# Patient Record
Sex: MALE | Race: WHITE
[De-identification: names, ages, dates, MRNs, and addresses within clinical notes are randomized per-mention and may not be internally consistent; named-entity substitution may affect disease eponyms.]

---

## 2019-10-30 ENCOUNTER — HOSPITAL ENCOUNTER (INPATIENT)
Dept: HOSPITAL 95 - ER | Age: 59
LOS: 1 days | Discharge: HOME | DRG: 247 | End: 2019-10-31
Attending: INTERNAL MEDICINE | Admitting: INTERNAL MEDICINE
Payer: COMMERCIAL

## 2019-10-30 VITALS — BODY MASS INDEX: 24.59 KG/M2 | HEIGHT: 69.02 IN | WEIGHT: 166.01 LBS

## 2019-10-30 DIAGNOSIS — E78.5: ICD-10-CM

## 2019-10-30 DIAGNOSIS — I16.1: ICD-10-CM

## 2019-10-30 DIAGNOSIS — I21.19: Primary | ICD-10-CM

## 2019-10-30 DIAGNOSIS — I10: ICD-10-CM

## 2019-10-30 DIAGNOSIS — Z87.891: ICD-10-CM

## 2019-10-30 LAB
ALBUMIN SERPL BCP-MCNC: 3.1 G/DL (ref 3.4–5)
ALBUMIN SERPL BCP-MCNC: 3.8 G/DL (ref 3.4–5)
ALBUMIN/GLOB SERPL: 1.1 {RATIO} (ref 0.8–1.8)
ALBUMIN/GLOB SERPL: 1.2 {RATIO} (ref 0.8–1.8)
ALT SERPL W P-5'-P-CCNC: 33 U/L (ref 12–78)
ALT SERPL W P-5'-P-CCNC: 36 U/L (ref 12–78)
ANION GAP SERPL CALCULATED.4IONS-SCNC: 11 MMOL/L (ref 6–16)
ANION GAP SERPL CALCULATED.4IONS-SCNC: 7 MMOL/L (ref 6–16)
AST SERPL W P-5'-P-CCNC: 28 U/L (ref 12–37)
AST SERPL W P-5'-P-CCNC: 34 U/L (ref 12–37)
BASOPHILS # BLD AUTO: 0.03 K/MM3 (ref 0–0.23)
BASOPHILS # BLD AUTO: 0.04 K/MM3 (ref 0–0.23)
BASOPHILS NFR BLD AUTO: 0 % (ref 0–2)
BASOPHILS NFR BLD AUTO: 0 % (ref 0–2)
BILIRUB SERPL-MCNC: 0.2 MG/DL (ref 0.1–1)
BILIRUB SERPL-MCNC: 0.3 MG/DL (ref 0.1–1)
BUN SERPL-MCNC: 20 MG/DL (ref 8–24)
BUN SERPL-MCNC: 25 MG/DL (ref 8–24)
CA-I BLD-SCNC: 1.12 MMOL/L (ref 1.1–1.46)
CALCIUM SERPL-MCNC: 7.8 MG/DL (ref 8.5–10.1)
CALCIUM SERPL-MCNC: 8.9 MG/DL (ref 8.5–10.1)
CHLORIDE BLD-SCNC: 103 MMOL/L (ref 98–108)
CHLORIDE SERPL-SCNC: 104 MMOL/L (ref 98–108)
CHLORIDE SERPL-SCNC: 108 MMOL/L (ref 98–108)
CHOLEST SERPL-MCNC: 324 MG/DL (ref 50–200)
CHOLEST/HDLC SERPL: 4 {RATIO}
CK MB CFR SERPL CALC: 4.1 % (ref 0–4)
CK MB CFR SERPL CALC: 4.4 % (ref 0–4)
CK MB CFR SERPL CALC: 5.6 % (ref 0–4)
CK SERPL-CCNC: 380 U/L (ref 39–308)
CK SERPL-CCNC: 444 U/L (ref 39–308)
CK SERPL-CCNC: 512 U/L (ref 39–308)
CO2 BLD-SCNC: 27 MMOL/L (ref 21–32)
CO2 SERPL-SCNC: 27 MMOL/L (ref 21–32)
CO2 SERPL-SCNC: 28 MMOL/L (ref 21–32)
CREAT BLD-MCNC: 1.2 MG/DL (ref 0.8–1.3)
CREAT SERPL-MCNC: 1.01 MG/DL (ref 0.6–1.2)
CREAT SERPL-MCNC: 1.08 MG/DL (ref 0.6–1.2)
DEPRECATED RDW RBC AUTO: 43.5 FL (ref 35.1–46.3)
DEPRECATED RDW RBC AUTO: 44.2 FL (ref 35.1–46.3)
EOSINOPHIL # BLD AUTO: 0.05 K/MM3 (ref 0–0.68)
EOSINOPHIL # BLD AUTO: 0.09 K/MM3 (ref 0–0.68)
EOSINOPHIL NFR BLD AUTO: 0 % (ref 0–6)
EOSINOPHIL NFR BLD AUTO: 1 % (ref 0–6)
ERYTHROCYTE [DISTWIDTH] IN BLOOD BY AUTOMATED COUNT: 12.7 % (ref 11.7–14.2)
ERYTHROCYTE [DISTWIDTH] IN BLOOD BY AUTOMATED COUNT: 12.9 % (ref 11.7–14.2)
GLOBULIN SER CALC-MCNC: 2.7 G/DL (ref 2.2–4)
GLOBULIN SER CALC-MCNC: 3.3 G/DL (ref 2.2–4)
GLUCOSE BLDC GLUCOMTR-MCNC: 175 MG/DL (ref 70–99)
GLUCOSE SERPL-MCNC: 123 MG/DL (ref 70–99)
GLUCOSE SERPL-MCNC: 173 MG/DL (ref 70–99)
HCT VFR BLD AUTO: 37.6 % (ref 37–53)
HCT VFR BLD AUTO: 43.9 % (ref 37–53)
HCT VFR BLD CALC: 45 % (ref 41–53)
HDLC SERPL-MCNC: 80 MG/DL (ref 39–?)
HGB BLD CALC-MCNC: 15.3 G/DL (ref 13.5–17.5)
HGB BLD-MCNC: 12.7 G/DL (ref 13.5–17.5)
HGB BLD-MCNC: 15 G/DL (ref 13.5–17.5)
IMM GRANULOCYTES # BLD AUTO: 0.03 K/MM3 (ref 0–0.1)
IMM GRANULOCYTES # BLD AUTO: 0.05 K/MM3 (ref 0–0.1)
IMM GRANULOCYTES NFR BLD AUTO: 0 % (ref 0–1)
IMM GRANULOCYTES NFR BLD AUTO: 0 % (ref 0–1)
LDLC SERPL CALC-MCNC: 220 MG/DL (ref 0–110)
LDLC/HDLC SERPL: 2.8 {RATIO}
LYMPHOCYTES # BLD AUTO: 1.43 K/MM3 (ref 0.84–5.2)
LYMPHOCYTES # BLD AUTO: 2.6 K/MM3 (ref 0.84–5.2)
LYMPHOCYTES NFR BLD AUTO: 11 % (ref 21–46)
LYMPHOCYTES NFR BLD AUTO: 23 % (ref 21–46)
MAGNESIUM SERPL-MCNC: 2 MG/DL (ref 1.6–2.4)
MCHC RBC AUTO-ENTMCNC: 33.8 G/DL (ref 31.5–36.5)
MCHC RBC AUTO-ENTMCNC: 34.2 G/DL (ref 31.5–36.5)
MCV RBC AUTO: 92 FL (ref 80–100)
MCV RBC AUTO: 93 FL (ref 80–100)
MONOCYTES # BLD AUTO: 0.86 K/MM3 (ref 0.16–1.47)
MONOCYTES # BLD AUTO: 1.04 K/MM3 (ref 0.16–1.47)
MONOCYTES NFR BLD AUTO: 8 % (ref 4–13)
MONOCYTES NFR BLD AUTO: 8 % (ref 4–13)
NEUTROPHILS # BLD AUTO: 7.83 K/MM3 (ref 1.96–9.15)
NEUTROPHILS # BLD AUTO: 9.98 K/MM3 (ref 1.96–9.15)
NEUTROPHILS NFR BLD AUTO: 68 % (ref 41–73)
NEUTROPHILS NFR BLD AUTO: 80 % (ref 41–73)
NRBC # BLD AUTO: 0 K/MM3 (ref 0–0.02)
NRBC # BLD AUTO: 0 K/MM3 (ref 0–0.02)
NRBC BLD AUTO-RTO: 0 /100 WBC (ref 0–0.2)
NRBC BLD AUTO-RTO: 0 /100 WBC (ref 0–0.2)
PLATELET # BLD AUTO: 232 K/MM3 (ref 150–400)
PLATELET # BLD AUTO: 267 K/MM3 (ref 150–400)
POTASSIUM BLD-SCNC: 3.4 MMOL/L (ref 3.5–5.5)
POTASSIUM SERPL-SCNC: 3.3 MMOL/L (ref 3.5–5.5)
POTASSIUM SERPL-SCNC: 3.4 MMOL/L (ref 3.5–5.5)
PROT SERPL-MCNC: 5.8 G/DL (ref 6.4–8.2)
PROT SERPL-MCNC: 7.1 G/DL (ref 6.4–8.2)
SODIUM BLD-SCNC: 140 MMOL/L (ref 135–148)
SODIUM SERPL-SCNC: 142 MMOL/L (ref 136–145)
SODIUM SERPL-SCNC: 143 MMOL/L (ref 136–145)
TRIGL SERPL-MCNC: 119 MG/DL (ref 30–160)
TROPONIN I SERPL-MCNC: 0.74 NG/ML (ref 0–0.04)
TROPONIN I SERPL-MCNC: 10.3 NG/ML (ref 0–0.04)
TROPONIN I SERPL-MCNC: 2.83 NG/ML (ref 0–0.04)
TROPONIN I SERPL-MCNC: 8.76 NG/ML (ref 0–0.04)
VLDLC SERPL CALC-MCNC: 23 MG/DL (ref 6–32)

## 2019-10-30 PROCEDURE — B2111ZZ FLUOROSCOPY OF MULTIPLE CORONARY ARTERIES USING LOW OSMOLAR CONTRAST: ICD-10-PCS | Performed by: INTERNAL MEDICINE

## 2019-10-30 PROCEDURE — C1753 CATH, INTRAVAS ULTRASOUND: HCPCS

## 2019-10-30 PROCEDURE — C1887 CATHETER, GUIDING: HCPCS

## 2019-10-30 PROCEDURE — C1894 INTRO/SHEATH, NON-LASER: HCPCS

## 2019-10-30 PROCEDURE — C1725 CATH, TRANSLUMIN NON-LASER: HCPCS

## 2019-10-30 PROCEDURE — C1769 GUIDE WIRE: HCPCS

## 2019-10-30 PROCEDURE — 027034Z DILATION OF CORONARY ARTERY, ONE ARTERY WITH DRUG-ELUTING INTRALUMINAL DEVICE, PERCUTANEOUS APPROACH: ICD-10-PCS | Performed by: INTERNAL MEDICINE

## 2019-10-30 PROCEDURE — C1874 STENT, COATED/COV W/DEL SYS: HCPCS

## 2019-10-30 PROCEDURE — C9606 PERC D-E COR REVASC W AMI S: HCPCS

## 2019-10-30 NOTE — NUR
PATIENT TOOK AM MEDICATIONS WITHOUT ANY PROBLEMS, STARTED REMOVEING AIR FROM
BOTH TR BANDS, NO BLEEDING NOTED AT THIS TIME, CALL LIGHT IN REACH, WILL
CONTINUE TO MONITOR.

## 2019-10-30 NOTE — NUR
PT REPORTS TENDERNESS ABOVE RADIAL ACCESS SITE. SITE ABOVE TR BAND APPEARS
BRUISED AND SWOLLEN (5 CM HEMATOMA). HELD MANUAL PRESSURE TO AFFECTED AREA AND
APPLIED ADDITIONAL TR BAND WITH 10 CC AIR. PT REMINDED TO MINIMIZE MOVEMENT TO
RIGHT WRIST.
PT STATES THAT HE "HEALS IMMEDIATELY" AND THAT PUNCTURES "SEAL
QUICKLY". PT DENIES NUMBNESS OR TINGLING IN FINGERS, PALLOR AND CAP REFILL
WNL, SPO2 MONITOR ON POINTER FINGER WITH GOOD READING.
PT CONTINUES TO DENY CHEST PAIN, SHORTNESS OF BREATH, AND NAUSEA.
SEE ADMISSION NOTES. WILL REPORT TO DAYSHIFT NURSE.

## 2019-10-30 NOTE — NUR
***SHIFT NOTE***
PT ARRIVED FROM ICU AT 1450, ONCE TO ROOM PT REQUESTING TO GO OUTSIDE TO
SMOKE, AFTER EDUCATION PT AGREED TO STAY IN BUIDLING. PT DENIES CP OR SOB. PER
ICU PT HAD DEVELOPED A HEMATOMA TO RT WRIST ANGIO SITE, NO CONTUSION NOTED,
SKIN AROUND SITE IS SOFT. ARM BOARD IN PLACE. PT EXPRESSED UNDERSTANDING NOT
TO USE RT WRIST. NO DRAINAGE NOTED TO RT WRIST SITE. PT A/O X4.

## 2019-10-30 NOTE — NUR
DR. VALERO IN TO SEE PATIENT, PATIENT NOW PCU STATUS, WILL CONTINUE TO
REMOVE AIR FROM BOTH TR BANDS, CALL LIGHT IN REACH, WILL CONTINUE TO MONITOR.

## 2019-10-30 NOTE — NUR
BOTH TR BANDS REMOVED, OPSITE APPLIED TO ACCESS SITE, HEMATOMA ABOVE ACCESS
SITE SOFT, NONTENDER ON PALPATION, ARMBOARD IN PLACE, CALL LIGHT IN REACH,
WILL CONTINUE TO MONITOR.

## 2019-10-30 NOTE — NUR
START OF SHIFT NOTE:  PATIENT IS RESTING WITH EYES CLOSED, OPENS THEM
SPONTANEOUSLY, ALERT AND ORIENTED, LUNG SOUNDS DIMINISHED, NSR, PATIENT HAD 2
STENTS PLACED TO RCA AND TR BAND IN PLACE ON RIGHT RADIAL ACCESS SITE, AFTER
PROCEDURE PATIENT DEVELOPED HEMATOMA ABOVE ACCESS SITE AND HAS A SECOND TR
BAND ABOVE FIRST ONE, ARMBOARD IN PLACE, WILL START REMOVING AIR GRADUALLY,
PATIENT USES URINAL TO VOID BUT IS NONCOMPLIANT WITH CALL LIGHT, PATIENT ASKED
TO HAVE THE ROOM PHONE AND MADE PHONE CALL, REPORTED NO PAIN, TEMP ELEVATED AT
100.2, CALL LIGHT IN REACH, WILL CONTINUE TO MONITOR.

## 2019-10-30 NOTE — NUR
PT TO ICU 5 FROM CATH LAB AT 0305. PT ALERT AND ORIENTED LYING IN BED. TR BAND
LOCATED ON RIGHT RADIAL. SITE SOFT, NONTENDER, DISTAL PULSES/PALLOR/SENSATION
WNL, CAP REFILL<3 SECONDS. PT DENIES CHEST PAIN, SHORTNESS OF BREATH, OR N/T
AT THIS TIME. PT ABLE TO STAND AT BEDSIDE TO USE URINAL WITH NO DIFFICULTY.
 
 
PT APPEARS TO HAVE FLEETING THOUGHTS AND IS SPEAKING VERY QUICKLY AND DOES NOT
DIRECTLY ANSWER QUESTIONS. PT REPORTS THAT HE FEELS LIKE THIS "PRE HEART
ATTACK" WAS CAUSED BY "MUSHROOMS THAT ARE GROWING ON HIS PROPERTY" PT STATES
HE IS ALLERGIC TO SAID MUSHROOMS AND HE HAS BEEN TRYING TO RID HIS PROPERTY OF
THEM. PT ALSO REPORTS HE IS ON "PREDNISONE FOR POISON [place]". PT DENIES ANY
PREVIOUS HEALTH HISTORY AND STATES THAT HE USES "NATURAL REMEDIES AS NEEDED".
PT REPORTS THAT HE MAINTAINS HIS BLOOD PRESSURE BY TAKING "MAGNESIUM
SUPPLEMENTS", CONTROLS HIS BIPOLAR WITH "HOT TEA" AND CHOOSES TO HAVE "HIGHER
THAN RECOMMENDED CHOLESTEROL BECAUSE THE SUGGESTED TARGET NUMBERS MAKE HIM
FEEL WEAK". WHEN ASKED WHY PATIENT SEES DR. ALMAZAN PT REPORTS THAT HE WAS IN
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
STATES THAT HE "WENT TO [place] AND HAD THE FILLING TAKEN OUT AND HAS HAD NO
PROBLEMS SINCE".
VSS ON ADMISSION TO ICU. SEE FULL ADMISSION HX AND ASSESSMENT.

## 2019-10-30 NOTE — NUR
AIR WAS REMOVED COMPLETELY FROM BOTH TR BANDS AT 10:43, PATIENT TOLERATED
WELL, BOTH TR BANDS ARE STILL IN PLACE, WILL MONITOR FOR BLEEDING OR HEMATOMA.

## 2019-10-30 NOTE — NUR
RIGHT RADIAL ACCESS SITE COVERED WITH OPSITE, NO BLEEDING NOTED, NONTENDER ON
PALPATION, SOFT, SLIGHT BRUISE AND TIGHTNESS NOTED ABOVE ACCESS SITE D/T
HEMATOMA RIGHT AFTER FIRST TR BAND WAS PLACED, PATIENT REPORTS NO PAIN,
AFEBRILE, CALL LIGHT IN REACH, WILL CONTINUE TO MONITOR.

## 2019-10-31 NOTE — NUR
CONMTINUES TO BE AXO, DENYING CP/SOB. HAS BEEN [facility]ALK[facility] AROUND UNIT. HAS
HAD DIFFICULTY SLEEPING NTHIS SHIFT BUT STGATES THIS HAS BEEN NORMAL FOR HIM
AT HOME RECENTLY. REMAINS IN SR WITHOUT ST ELEVATION. HTN BUT DOES DECREASE
WITH REST. HAVE BEEN MONITORING FOR NEED TO USE PRN HYDRALZINE. PT USES CALL
LIGHT APPROPRIATELY. R WRIST SIGHT W/OUT NEW BLEEDING. NONTENDER TO PALP,
DISTAL PULSES PALPABLE. SPO2 >92% ON R THUMB. ARMBOARD IN PLACE, PT MADE VERY
AWARE TO NOT USE THIS HAND AND TO KEEP ARMBOARD IN PLACE. WILL CONTINUE TO
MONITOR UNTIUL SHIFT CHANGE

## 2019-10-31 NOTE — NUR
PT CALLS CARE MANAGEMENT AND PHARMACY CANNOT FILL BRILINTA.  PRE AUTH HAS NOT
COME IN YET.  [email or web address] VALERO IS CALLED BY THIS RN AND ORDER RECEIVED FOR
PLAVIX 600MG LOADING DOSE X 1 THEN START 75MG PER DAY PLAVIX.  PHONED TO
[facility] [place] SPOKE WITH LUIS FABIAN.  [email or web address].[id number].[id number].  PHONED
PATIENT AND LEFT MESSAGE TO CALL BACK.
 
HARRY GRIMES RN
CARE MANAGER.

## 2020-09-02 ENCOUNTER — HOSPITAL ENCOUNTER (EMERGENCY)
Dept: HOSPITAL 95 - ER | Age: 60
LOS: 1 days | Discharge: HOME | End: 2020-09-03
Payer: COMMERCIAL

## 2020-09-02 VITALS — WEIGHT: 155.01 LBS | HEIGHT: 69 IN | BODY MASS INDEX: 22.96 KG/M2

## 2020-09-02 DIAGNOSIS — F17.200: ICD-10-CM

## 2020-09-02 DIAGNOSIS — Z88.5: ICD-10-CM

## 2020-09-02 DIAGNOSIS — I10: ICD-10-CM

## 2020-09-02 DIAGNOSIS — Z88.6: ICD-10-CM

## 2020-09-02 DIAGNOSIS — N13.2: Primary | ICD-10-CM

## 2020-09-02 LAB
BASOPHILS # BLD AUTO: 0.06 K/MM3 (ref 0–0.23)
BASOPHILS NFR BLD AUTO: 0 % (ref 0–2)
DEPRECATED RDW RBC AUTO: 42.5 FL (ref 35.1–46.3)
EOSINOPHIL # BLD AUTO: 0.08 K/MM3 (ref 0–0.68)
EOSINOPHIL NFR BLD AUTO: 1 % (ref 0–6)
ERYTHROCYTE [DISTWIDTH] IN BLOOD BY AUTOMATED COUNT: 12.8 % (ref 11.7–14.2)
HCT VFR BLD AUTO: 40 % (ref 37–53)
HGB BLD-MCNC: 14 G/DL (ref 13.5–17.5)
IMM GRANULOCYTES # BLD AUTO: 0.03 K/MM3 (ref 0–0.1)
IMM GRANULOCYTES NFR BLD AUTO: 0 % (ref 0–1)
KETONES UR STRIP-MCNC: (no result) MG/DL
LEUKOCYTE ESTERASE UR QL STRIP: (no result)
LYMPHOCYTES # BLD AUTO: 1.86 K/MM3 (ref 0.84–5.2)
LYMPHOCYTES NFR BLD AUTO: 14 % (ref 21–46)
MCHC RBC AUTO-ENTMCNC: 35 G/DL (ref 31.5–36.5)
MCV RBC AUTO: 91 FL (ref 80–100)
MONOCYTES # BLD AUTO: 1.23 K/MM3 (ref 0.16–1.47)
MONOCYTES NFR BLD AUTO: 9 % (ref 4–13)
NEUTROPHILS # BLD AUTO: 10.13 K/MM3 (ref 1.96–9.15)
NEUTROPHILS NFR BLD AUTO: 76 % (ref 41–73)
NRBC # BLD AUTO: 0 K/MM3 (ref 0–0.02)
NRBC BLD AUTO-RTO: 0 /100 WBC (ref 0–0.2)
PLATELET # BLD AUTO: 266 K/MM3 (ref 150–400)
PROT UR STRIP-MCNC: (no result) MG/DL
SP GR SPEC: 1.01 (ref 1–1.02)
UROBILINOGEN UR STRIP-MCNC: (no result) MG/DL

## 2020-09-03 LAB
ALBUMIN SERPL BCP-MCNC: 3.8 G/DL (ref 3.4–5)
ALBUMIN/GLOB SERPL: 1.1 {RATIO} (ref 0.8–1.8)
ALT SERPL W P-5'-P-CCNC: 32 U/L (ref 12–78)
ANION GAP SERPL CALCULATED.4IONS-SCNC: 9 MMOL/L (ref 6–16)
AST SERPL W P-5'-P-CCNC: 15 U/L (ref 12–37)
BILIRUB SERPL-MCNC: 0.9 MG/DL (ref 0.1–1)
BUN SERPL-MCNC: 24 MG/DL (ref 8–24)
CALCIUM SERPL-MCNC: 9.1 MG/DL (ref 8.5–10.1)
CHLORIDE SERPL-SCNC: 108 MMOL/L (ref 98–108)
CO2 SERPL-SCNC: 22 MMOL/L (ref 21–32)
CREAT SERPL-MCNC: 1.36 MG/DL (ref 0.6–1.2)
GLOBULIN SER CALC-MCNC: 3.5 G/DL (ref 2.2–4)
GLUCOSE SERPL-MCNC: 107 MG/DL (ref 70–99)
POTASSIUM SERPL-SCNC: 3.8 MMOL/L (ref 3.5–5.5)
PROT SERPL-MCNC: 7.3 G/DL (ref 6.4–8.2)
RBC #/AREA URNS HPF: (no result) /HPF (ref 0–2)
SODIUM SERPL-SCNC: 139 MMOL/L (ref 136–145)